# Patient Record
Sex: MALE | NOT HISPANIC OR LATINO | Employment: UNEMPLOYED | URBAN - NONMETROPOLITAN AREA
[De-identification: names, ages, dates, MRNs, and addresses within clinical notes are randomized per-mention and may not be internally consistent; named-entity substitution may affect disease eponyms.]

---

## 2018-01-30 ENCOUNTER — OFFICE VISIT (OUTPATIENT)
Dept: ORTHOPEDIC SURGERY | Facility: CLINIC | Age: 18
End: 2018-01-30

## 2018-01-30 VITALS — BODY MASS INDEX: 20.31 KG/M2 | HEIGHT: 68 IN | TEMPERATURE: 97.5 F | WEIGHT: 134 LBS

## 2018-01-30 DIAGNOSIS — S63.259A DISLOCATION OF FINGER, INITIAL ENCOUNTER: Primary | ICD-10-CM

## 2018-01-30 PROCEDURE — 99203 OFFICE O/P NEW LOW 30 MIN: CPT | Performed by: ORTHOPAEDIC SURGERY

## 2018-02-08 PROBLEM — S63.259A DISLOCATION OF FINGER: Status: ACTIVE | Noted: 2018-02-08

## 2023-05-09 ENCOUNTER — OFFICE VISIT (OUTPATIENT)
Dept: ORTHOPEDIC SURGERY | Facility: CLINIC | Age: 23
End: 2023-05-09
Payer: COMMERCIAL

## 2023-05-09 VITALS — BODY MASS INDEX: 18.37 KG/M2 | OXYGEN SATURATION: 97 % | WEIGHT: 121.2 LBS | HEIGHT: 68 IN

## 2023-05-09 DIAGNOSIS — S46.212A BICEPS STRAIN, LEFT, INITIAL ENCOUNTER: ICD-10-CM

## 2023-05-09 DIAGNOSIS — M25.512 ACUTE PAIN OF LEFT SHOULDER: Primary | ICD-10-CM

## 2023-05-09 NOTE — PROGRESS NOTES
"Dipak is a 22 y.o. year old male presents to [facility]    Chief Complaint   Patient presents with   • Left Shoulder - Pain     Pain 5/10   • Initial Evaluation       History of Present Illness  Dipak Peters is a 22 y.o. male presents to clinic for evaluation of left shoulder pain that began when he was lifting a wide piece of heavy wood at work 3-4 weeks ago, when he felt a pop. He  Has played some contact sports since the initial injury and believes the shoulder pain has worsened. Reports pain with quick, jerking movements. Reports popping and clicking with range of motion to left shoulder. Past treatments denies any. Qualifies as a throbbing at times.  Denies any pain at night. Denies any prior surgeries or injuries to this joint.     I have reviewed the patient's medical, family, and social history in detail and updated the computerized patient record.    Objective:  Ht 171.5 cm (67.5\")   Wt 55 kg (121 lb 3.2 oz)   SpO2 97%   BMI 18.70 kg/m²      Physical Exam  Vital signs reviewed.   General: No acute distress.  Eyes: conjunctiva clear  ENT: external ears atraumatic  CV: no peripheral edema  Resp: normal respiratory effort  Skin: no rashes or wounds; normal turgor  Psych: mood and affect appropriate; recent and remote memory intact  Neuro: sensation to light touch intact    MSK Exam  Left shoulder:    Intact skin. NO effusion. NO ecchymosis  Tenderness to palpation over the bicep groove and posterior shoulder  Active forward flexion 125 (painful arc ), abduction 140 (painful arc 115-140), ER 45, IR L1  Pain but no weakness with O'patrica and supraspinatus   Positive pedraza, positive scarf  Positive speed  Positive Apprehension  Belly press 5/5; lift off 5/5 no pain    Imaging:  none    Assessment:  Diagnoses and all orders for this visit:    Acute pain of left shoulder    Biceps strain, left, initial encounter      Plan: Recommend rehabilitating the left " shoulder with exercises provided today, ice, NSAIDs, tylenol. Follow up in 3-4 weeks for further evaluation and consideration for formal PT versus MRI for suspected rotator cuff versus labral pathology.      Follow Up   Return in about 4 weeks (around 6/6/2023).  Patient was given instructions and counseling regarding his condition or for health maintenance advice. Please see specific information pulled into the AVS if appropriate.     EMR Dragon/Transcription disclaimer:    Much of this encounter note is an electronic transcription/translation of spoken language to printed text.  The electronic translation of spoken language may permit erroneous, or at times, nonsensical words or phrases to be inadvertently transcribed.  Although I have reviewed the note for such errors some may still exist.

## 2024-09-25 ENCOUNTER — HOSPITAL ENCOUNTER (EMERGENCY)
Facility: HOSPITAL | Age: 24
Discharge: HOME | End: 2024-09-25
Attending: EMERGENCY MEDICINE
Payer: OTHER MISCELLANEOUS

## 2024-09-25 ENCOUNTER — APPOINTMENT (OUTPATIENT)
Dept: GENERAL RADIOLOGY | Facility: HOSPITAL | Age: 24
End: 2024-09-25
Payer: COMMERCIAL

## 2024-09-25 VITALS
RESPIRATION RATE: 12 BRPM | BODY MASS INDEX: 17.96 KG/M2 | SYSTOLIC BLOOD PRESSURE: 113 MMHG | DIASTOLIC BLOOD PRESSURE: 61 MMHG | TEMPERATURE: 98.4 F | HEIGHT: 69 IN | WEIGHT: 121.25 LBS | OXYGEN SATURATION: 99 % | HEART RATE: 69 BPM

## 2024-09-25 DIAGNOSIS — S51.812A LACERATION OF LEFT FOREARM, INITIAL ENCOUNTER: Primary | ICD-10-CM

## 2024-09-25 PROCEDURE — 73090 X-RAY EXAM OF FOREARM: CPT

## 2024-09-25 PROCEDURE — 99283 EMERGENCY DEPT VISIT LOW MDM: CPT

## 2024-09-25 RX ORDER — LIDOCAINE HYDROCHLORIDE AND EPINEPHRINE 10; 10 MG/ML; UG/ML
10 INJECTION, SOLUTION INFILTRATION; PERINEURAL ONCE
Status: COMPLETED | OUTPATIENT
Start: 2024-09-25 | End: 2024-09-25

## 2024-09-25 RX ORDER — DIAPER,BRIEF,INFANT-TODD,DISP
1 EACH MISCELLANEOUS ONCE
Status: COMPLETED | OUTPATIENT
Start: 2024-09-25 | End: 2024-09-25

## 2024-09-25 RX ADMIN — LIDOCAINE HYDROCHLORIDE,EPINEPHRINE BITARTRATE 10 ML: 10; .01 INJECTION, SOLUTION INFILTRATION; PERINEURAL at 09:30

## 2024-09-25 RX ADMIN — BACITRACIN 0.9 G: 500 OINTMENT TOPICAL at 10:31

## 2024-09-25 NOTE — Clinical Note
[facility] EMERGENCY DEPARTMENT  [address] [place] [address]  Phone: [phone]    Dipak Peters was seen and treated in our emergency department on 9/25/2024.  He may return to work on 09/26/2024.         Thank you for choosing [facility].    Jaclyn Squires RN

## 2024-09-25 NOTE — DISCHARGE INSTRUCTIONS
Clean wound daily with warm soapy water, keep dry, apply Neosporin as directed, use Tylenol or Motrin per package instructions for pain.   Return the ER for any worsening symptoms including increased pain, swelling, discoloration, coldness of extremity, redness, warmth, red streaks, fever.    Follow-up with primary care, urgent care, or return to the ED in 7 days for suture removal.

## 2024-09-25 NOTE — ED PROVIDER NOTES
Subjective   History of Present Illness  Patient is a 23-year-old male presenting to the ED for a laceration on his left forearm.  Patient states he was using a drill press on his sweatshirt got caught and ended up pulling his arm underneath the drill.  Patient denies any fever, chills, nausea, or lightheadedness.  He states he got a tetanus shot 3 years ago.        Review of Systems   Constitutional:  Negative for chills and fever.   Gastrointestinal:  Negative for nausea and vomiting.   Skin:  Positive for wound.   Neurological:  Negative for light-headedness.       No past medical history on file.    No Known Allergies    No past surgical history on file.    Family History   Family history unknown: Yes       Social History     Socioeconomic History    Marital status:    Tobacco Use    Smoking status: Never   Vaping Use    Vaping status: Every Day    Substances: Nicotine    Devices: Refillable tank   Substance and Sexual Activity    Alcohol use: No    Drug use: Yes     Types: Marijuana    Sexual activity: Defer           Objective   Physical Exam  Constitutional:       Appearance: Normal appearance.   HENT:      Head: Normocephalic and atraumatic.   Eyes:      Extraocular Movements: Extraocular movements intact.   Cardiovascular:      Rate and Rhythm: Normal rate and regular rhythm.      Pulses: Normal pulses.      Heart sounds: Normal heart sounds.   Pulmonary:      Effort: Pulmonary effort is normal.      Breath sounds: Normal breath sounds.   Musculoskeletal:         General: Normal range of motion.      Right forearm: Normal.      Left forearm: Laceration present.        Arms:       Cervical back: Normal range of motion.      Comments: 2 inch laceration noted above with continued bleeding.  No ecchymosis or edema noted.  No decreased range of motion in the left upper extremity, no loss of sensation, capillary refills normal, pulses palpated bilaterally.   Skin:     General: Skin is warm and dry.       Capillary Refill: Capillary refill takes less than 2 seconds.   Neurological:      General: No focal deficit present.      Mental Status: He is alert and oriented to person, place, and time.   Psychiatric:         Mood and Affect: Mood normal.         Behavior: Behavior normal.         Laceration Repair    Date/Time: 9/25/2024 11:15 AM    Performed by: Wanda Don PA-C  Authorized by: Berhane Fulton DO    Consent:     Consent obtained:  Verbal    Consent given by:  Patient    Risks, benefits, and alternatives were discussed: yes      Risks discussed:  Infection    Alternatives discussed:  No treatment and delayed treatment  Universal protocol:     Procedure explained and questions answered to patient or proxy's satisfaction: yes      Imaging studies available: yes      Site/side marked: yes      Immediately prior to procedure, a time out was called: yes      Patient identity confirmed:  Verbally with patient and arm band  Anesthesia:     Anesthesia method:  Local infiltration    Local anesthetic:  Lidocaine 1% WITH epi  Laceration details:     Location:  Shoulder/arm    Shoulder/arm location:  L lower arm    Length (cm):  5  Pre-procedure details:     Preparation:  Patient was prepped and draped in usual sterile fashion and imaging obtained to evaluate for foreign bodies  Exploration:     Imaging obtained: x-ray      Imaging outcome: foreign body not noted      Contaminated: no    Treatment:     Area cleansed with:  Chlorhexidine    Amount of cleaning:  Standard    Visualized foreign bodies/material removed: no      Debridement:  None    Undermining:  None    Layers/structures repaired:  Deep dermal/superficial fascia  Deep dermal/superficial fascia:     Suture size:  5-0    Suture material:  Vicryl    Suture technique:  Buried horizontal mattress    Number of sutures:  4  Skin repair:     Repair method:  Sutures    Suture size:  5-0    Suture material:  Nylon    Suture technique:  Simple interrupted     "Number of sutures:  5  Approximation:     Approximation:  Close  Repair type:     Repair type:  Simple  Post-procedure details:     Dressing:  Antibiotic ointment and non-adherent dressing    Procedure completion:  Tolerated well, no immediate complications             ED Course  ED Course as of 09/25/24 2030   Wed Sep 25, 2024   0942 XR Forearm 2 View Left [EC]      ED Course User Index  [EC] Arian Wanda YANG BECK      /61   Pulse 69   Temp 98.4 °F (36.9 °C) (Oral)   Resp 12   Ht 175.3 cm (69\")   Wt 55 kg (121 lb 4.1 oz)   SpO2 99%   BMI 17.91 kg/m²   Labs Reviewed - No data to display  Medications   lidocaine 1% - EPINEPHrine 1:095785 (XYLOCAINE W/EPI) 1 %-1:872772 injection 10 mL (10 mL Other Given by Other 9/25/24 0930)   bacitracin ointment 0.9 g (0.9 g Topical Given 9/25/24 1031)     XR Forearm 2 View Left    Result Date: 9/25/2024  Impression: No fracture or radiodense foreign body. Electronically Signed: Jose Matson MD  9/25/2024 9:35 AM EDT  Workstation ID: [id number]                                          Medical Decision Making  Patient presented to the ED for the above complaint.    Patient underwent the above exam and evaluation.    While in the ED x-ray was obtained of forearm to assess for any foreign bodies.  Up-to-date on tetanus shot.  Laceration was repaired as described above.  Patient tolerated well, neurovascularly intact.  Educated patient to keep wound clean and dry, use Neosporin as needed, and to follow-up with PCP, urgent care, or ED to get sutures removed in 7 days and to return the ED for any new or worsening symptoms.  Patient agreeable with dispo plan.    Labs considered and deemed unnecessary, patient is afebrile, nontoxic in appearance.    X-ray of left forearm independently interpreted by radiologist and reviewed by myself showing: No fracture or retained foreign bodies.    Appropriate PPE was worn during each patient encounter.        Problems Addressed:  Laceration " of left forearm, initial encounter: complicated acute illness or injury    Amount and/or Complexity of Data Reviewed  Radiology: ordered. Decision-making details documented in ED Course.    Risk  OTC drugs.  Prescription drug management.    Critical Care  Total time providing critical care: 40 minutes        Final diagnoses:   Laceration of left forearm, initial encounter       ED Disposition  ED Disposition       ED Disposition   Discharge    Condition   Stable    Comment   --               PATIENT CONNECTION - [facility] [id number]  [phone]  In 7 days  For suture removal         Medication List      No changes were made to your prescriptions during this visit.            Wanda Don PA-C  09/25/24 2030